# Patient Record
Sex: MALE | Race: WHITE | Employment: UNEMPLOYED | URBAN - METROPOLITAN AREA
[De-identification: names, ages, dates, MRNs, and addresses within clinical notes are randomized per-mention and may not be internally consistent; named-entity substitution may affect disease eponyms.]

---

## 2017-09-06 ENCOUNTER — OFFICE VISIT (OUTPATIENT)
Dept: ORTHOPEDIC SURGERY | Age: 37
End: 2017-09-06

## 2017-09-06 VITALS — WEIGHT: 175.04 LBS | BODY MASS INDEX: 24.51 KG/M2 | HEIGHT: 71 IN

## 2017-09-06 DIAGNOSIS — M79.642 BILATERAL HAND PAIN: Primary | ICD-10-CM

## 2017-09-06 DIAGNOSIS — R20.0 BILATERAL HAND NUMBNESS: ICD-10-CM

## 2017-09-06 DIAGNOSIS — M79.641 BILATERAL HAND PAIN: Primary | ICD-10-CM

## 2017-09-06 PROCEDURE — 73130 X-RAY EXAM OF HAND: CPT | Performed by: ORTHOPAEDIC SURGERY

## 2017-09-06 PROCEDURE — 99203 OFFICE O/P NEW LOW 30 MIN: CPT | Performed by: ORTHOPAEDIC SURGERY

## 2017-10-06 ENCOUNTER — OFFICE VISIT (OUTPATIENT)
Dept: ORTHOPEDIC SURGERY | Age: 37
End: 2017-10-06

## 2017-10-06 DIAGNOSIS — G56.03 BILATERAL CARPAL TUNNEL SYNDROME: Primary | ICD-10-CM

## 2017-10-06 PROCEDURE — 95909 NRV CNDJ TST 5-6 STUDIES: CPT | Performed by: PHYSICAL MEDICINE & REHABILITATION

## 2017-10-06 PROCEDURE — 95886 MUSC TEST DONE W/N TEST COMP: CPT | Performed by: PHYSICAL MEDICINE & REHABILITATION

## 2017-10-06 NOTE — MR AVS SNAPSHOT
After Visit Summary             Kathrine Stuart   10/6/2017 3:00 PM   Office Visit    Description:  Male : 1980   Provider:  Reggie Chopra MD   Department:  [address] [place] [address] and Future Appointments         Below is a list of your follow-up and future appointments. This may not be a complete list as you may have made appointments directly with providers that we are not aware of or your providers may have made some for you. Please call your providers to confirm appointments. It is important to keep your appointments. Please bring your current insurance card, photo ID, co-pay, and all medication bottles to your appointment. If self-pay, payment is expected at the time of service. Your To-Do List     Future Appointments Provider Department Dept Phone    10/9/2017 1:00 PM Marybeth Roger MD [facility] [phone]    Please arrive 15 minutes prior to appointment, bring photo ID and insurance card.          Information from Your Visit        Department     Name Address Phone Fax    [facility] [address] [phone]      You Were Seen for:         Comments    Bilateral carpal tunnel syndrome   [[id number]]         Vital Signs     Smoking Status                   Current Every Day Smoker              Medications and Orders      Allergies              Penicillins     Tramadol     Vicodin [Hydrocodone-acetaminophen]       We Ordered/Performed the following           ME MOTOR &/SENS 7-8 NRV CNDJ PRECONF ELTRODE LIMB     ME NEEDLE EMG EA EXTREMTY W/PARASPINL AREA COMPLETE          Additional Information        Basic Information     Date Of Birth Sex Race Ethnicity Preferred Language    1980 Male White Non-/Non  English      Problem List as of 10/6/2017  Date Reviewed: 2012                Depression    Depressive disorder    Back injury If you have questions, please contact the physician practice where you receive care. Remember, MyChart is NOT to be used for urgent needs. For medical emergencies, dial 911. For questions regarding your [facility] account call [phone]. If you have a clinical question, please call your doctor's office.

## 2017-10-06 NOTE — PROGRESS NOTES
APB.  The remainder of the nerve conduction studies and monopolar exam are normal, as recorded above. Impression: Abnormal examination. There is electrodiagnostic evidence of:    1.   Moderate bilateral median mononeuropathies around the wrists(carpal tunnel syndrome), chronic on the left            Bridgette Gupta MD

## 2017-10-09 ENCOUNTER — OFFICE VISIT (OUTPATIENT)
Dept: ORTHOPEDIC SURGERY | Age: 37
End: 2017-10-09

## 2017-10-09 VITALS — BODY MASS INDEX: 24.51 KG/M2 | HEIGHT: 71 IN | WEIGHT: 175.04 LBS

## 2017-10-09 DIAGNOSIS — G56.03 BILATERAL CARPAL TUNNEL SYNDROME: Primary | ICD-10-CM

## 2017-10-09 PROCEDURE — 99213 OFFICE O/P EST LOW 20 MIN: CPT | Performed by: ORTHOPAEDIC SURGERY

## 2017-10-09 PROCEDURE — 20526 THER INJECTION CARP TUNNEL: CPT | Performed by: ORTHOPAEDIC SURGERY

## 2017-10-09 RX ORDER — MELOXICAM 15 MG/1
15 TABLET ORAL DAILY
Qty: 30 TABLET | Refills: 3 | Status: SHIPPED | OUTPATIENT
Start: 2017-10-09 | End: 2017-12-08

## 2017-10-09 NOTE — PROGRESS NOTES
INJECTION ADMINISTRATION DETAILS:    Date & Time:  10/9/17 1:21 PM  Site & Comments: right wrist  Administered by Dr Roddy Jaquez    Betamethasone (30mg/mL)  #of CC:1  NDC #: 8714-6904-39  Lot #: [id number]  EXP: 2/2019    1% Lidocaine ( 10mg/mL)  # of CC : 1  NDC #: 8274-4395-16  LOT# :-[id number]  EXP :9/2018

## 2017-12-06 ENCOUNTER — TELEPHONE (OUTPATIENT)
Dept: ORTHOPEDIC SURGERY | Age: 37
End: 2017-12-06

## 2017-12-11 ENCOUNTER — HOSPITAL ENCOUNTER (OUTPATIENT)
Dept: SURGERY | Age: 37
Discharge: HOME | End: 2017-12-11
Attending: ORTHOPAEDIC SURGERY | Admitting: ORTHOPAEDIC SURGERY

## 2017-12-11 VITALS
HEART RATE: 66 BPM | TEMPERATURE: 97 F | RESPIRATION RATE: 16 BRPM | OXYGEN SATURATION: 97 % | DIASTOLIC BLOOD PRESSURE: 60 MMHG | SYSTOLIC BLOOD PRESSURE: 114 MMHG

## 2017-12-11 RX ORDER — HYDROMORPHONE HCL 110MG/55ML
0.5 PATIENT CONTROLLED ANALGESIA SYRINGE INTRAVENOUS EVERY 10 MIN PRN
Status: DISCONTINUED | OUTPATIENT
Start: 2017-12-11 | End: 2017-12-12 | Stop reason: HOSPADM

## 2017-12-11 RX ORDER — LABETALOL HYDROCHLORIDE 5 MG/ML
5 INJECTION, SOLUTION INTRAVENOUS
Status: DISCONTINUED | OUTPATIENT
Start: 2017-12-11 | End: 2017-12-12 | Stop reason: HOSPADM

## 2017-12-11 RX ORDER — OXYCODONE HYDROCHLORIDE AND ACETAMINOPHEN 5; 325 MG/1; MG/1
1 TABLET ORAL EVERY 6 HOURS PRN
Qty: 25 TABLET | Refills: 0 | Status: SHIPPED | OUTPATIENT
Start: 2017-12-11 | End: 2017-12-18

## 2017-12-11 RX ORDER — HYDRALAZINE HYDROCHLORIDE 20 MG/ML
5 INJECTION INTRAMUSCULAR; INTRAVENOUS EVERY 30 MIN PRN
Status: DISCONTINUED | OUTPATIENT
Start: 2017-12-11 | End: 2017-12-12 | Stop reason: HOSPADM

## 2017-12-11 RX ORDER — SODIUM CHLORIDE, SODIUM LACTATE, POTASSIUM CHLORIDE, CALCIUM CHLORIDE 600; 310; 30; 20 MG/100ML; MG/100ML; MG/100ML; MG/100ML
INJECTION, SOLUTION INTRAVENOUS CONTINUOUS
Status: DISCONTINUED | OUTPATIENT
Start: 2017-12-11 | End: 2017-12-12 | Stop reason: HOSPADM

## 2017-12-11 RX ORDER — OXYCODONE HYDROCHLORIDE AND ACETAMINOPHEN 5; 325 MG/1; MG/1
1 TABLET ORAL PRN
Status: ACTIVE | OUTPATIENT
Start: 2017-12-11 | End: 2017-12-11

## 2017-12-11 RX ORDER — DIPHENHYDRAMINE HYDROCHLORIDE 50 MG/ML
6.25 INJECTION INTRAMUSCULAR; INTRAVENOUS
Status: COMPLETED | OUTPATIENT
Start: 2017-12-11 | End: 2017-12-11

## 2017-12-11 RX ORDER — ONDANSETRON 2 MG/ML
4 INJECTION INTRAMUSCULAR; INTRAVENOUS EVERY 30 MIN PRN
Status: DISCONTINUED | OUTPATIENT
Start: 2017-12-11 | End: 2017-12-12 | Stop reason: HOSPADM

## 2017-12-11 RX ORDER — 0.9 % SODIUM CHLORIDE 0.9 %
10 VIAL (ML) INJECTION EVERY 12 HOURS SCHEDULED
Status: DISCONTINUED | OUTPATIENT
Start: 2017-12-11 | End: 2017-12-12 | Stop reason: HOSPADM

## 2017-12-11 RX ORDER — MEPERIDINE HYDROCHLORIDE 25 MG/ML
12.5 INJECTION INTRAMUSCULAR; INTRAVENOUS; SUBCUTANEOUS EVERY 5 MIN PRN
Status: DISCONTINUED | OUTPATIENT
Start: 2017-12-11 | End: 2017-12-12 | Stop reason: HOSPADM

## 2017-12-11 RX ORDER — OXYCODONE HYDROCHLORIDE AND ACETAMINOPHEN 5; 325 MG/1; MG/1
2 TABLET ORAL PRN
Status: ACTIVE | OUTPATIENT
Start: 2017-12-11 | End: 2017-12-11

## 2017-12-11 RX ORDER — HYDROMORPHONE HCL 110MG/55ML
0.5 PATIENT CONTROLLED ANALGESIA SYRINGE INTRAVENOUS EVERY 5 MIN PRN
Status: DISCONTINUED | OUTPATIENT
Start: 2017-12-11 | End: 2017-12-12 | Stop reason: HOSPADM

## 2017-12-11 RX ORDER — LIDOCAINE HYDROCHLORIDE 10 MG/ML
1 INJECTION, SOLUTION EPIDURAL; INFILTRATION; INTRACAUDAL; PERINEURAL
Status: COMPLETED | OUTPATIENT
Start: 2017-12-11 | End: 2017-12-11

## 2017-12-11 RX ORDER — 0.9 % SODIUM CHLORIDE 0.9 %
10 VIAL (ML) INJECTION PRN
Status: DISCONTINUED | OUTPATIENT
Start: 2017-12-11 | End: 2017-12-12 | Stop reason: HOSPADM

## 2017-12-11 RX ADMIN — LIDOCAINE HYDROCHLORIDE 0.1 ML: 10 INJECTION, SOLUTION EPIDURAL; INFILTRATION; INTRACAUDAL; PERINEURAL at 07:32

## 2017-12-11 RX ADMIN — DIPHENHYDRAMINE HYDROCHLORIDE 6.25 MG: 50 INJECTION INTRAMUSCULAR; INTRAVENOUS at 08:50

## 2017-12-11 RX ADMIN — SODIUM CHLORIDE, SODIUM LACTATE, POTASSIUM CHLORIDE, CALCIUM CHLORIDE: 600; 310; 30; 20 INJECTION, SOLUTION INTRAVENOUS at 07:33

## 2017-12-11 ASSESSMENT — PAIN - FUNCTIONAL ASSESSMENT: PAIN_FUNCTIONAL_ASSESSMENT: 0-10

## 2017-12-11 ASSESSMENT — PAIN DESCRIPTION - DESCRIPTORS: DESCRIPTORS: ACHING

## 2017-12-11 NOTE — ANESTHESIA POST-OP
Postoperative Anesthesia Note    Name:    Bernard Moura  MRN:      [id number]    Patient Vitals for the past 12 hrs:   BP Temp Temp src Pulse Resp SpO2   12/11/17 0915 114/60 - - 66 16 97 %   12/11/17 0900 115/69 - - 61 16 98 %   12/11/17 0845 121/77 - - 66 16 98 %   12/11/17 0833 120/68 97 °F (36.1 °C) Temporal 67 16 99 %   12/11/17 0714 108/63 97.7 °F (36.5 °C) Temporal 71 16 -        LABS:    CBC  Lab Results   Component Value Date/Time    WBC 6.9 09/19/2016 07:06 PM    HGB 13.8 09/19/2016 07:06 PM    HCT 41.1 09/19/2016 07:06 PM     09/19/2016 07:06 PM     RENAL  Lab Results   Component Value Date/Time     09/19/2016 07:06 PM    K 3.4 (L) 09/19/2016 07:06 PM     09/19/2016 07:06 PM    CO2 26 09/19/2016 07:06 PM    BUN 8 09/19/2016 07:06 PM    CREATININE 1.0 09/19/2016 07:06 PM    GLUCOSE 99 09/19/2016 07:06 PM     COAGS  Lab Results   Component Value Date/Time    PROTIME 10.8 08/21/2011 11:10 PM    INR 0.99 08/21/2011 11:10 PM    APTT 28.8 08/21/2011 11:10 PM       Intake & Output: In: 600 [I.V.:600]  Out: -     Nausea & Vomiting:  No    Level of Consciousness:  Awake    Pain Assessment:  Adequate analgesia    Anesthesia Complications:  No apparent anesthetic complications    SUMMARY      Vital signs stable  OK to discharge from Stage I post anesthesia care.   Care transferred from Anesthesiology department on discharge from perioperative area

## 2017-12-11 NOTE — OP NOTE
noted.  The nerve was nicely decompressed and healthy-appearing. The wound was copiously irrigated and the skin closed using horizontal mattress #5-0 nylon suture. Tourniquet was deflated and all fingers were warm and well perfused. Soft sterile dressing was placed. Under sterile conditions, the left carpal tunnel was injected with 1cc of 1% lidocaine without epinephrine, and 1cc of celestone without difficulty. A bandaid was placed. Patient was awoken from the sedation, tolerated the case quite well, was taken to the postanesthesia recovery unit in good condition. No complications. Findings:  No aberrant motor branch    Intervention:  1% Xylocaine, 0.25% Marcaine, without epinephrine as local injection  Celestone    Other Notes: Follow-up in 7-10 days for wound inspection and likely suture removal.  Patient will be taught a home exercise range of motion program for the wrist and fingers along with scar and thenar massage. If there are any concerns or if the patient desires, hand therapy will be ordered.           Pierce Rey MD  Hand & Upper Extremity Surgery  [address] [facility] [place] [facility] partner of [facility] ([facility])
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
## 2017-12-11 NOTE — PROGRESS NOTES
Admitted to PACU. Respirations easy and regular. Right hand elevated with ice. Right fingers warm and pink with good cap refill.
process. 23. Patient pain level is established preoperatively using age appropriate pain scale. 24. The patient will move to fall risk upon sedation- during and through the recovery phase. [place] to environment. Call light in reach. Instruct to call for assistance prior to getting up. Non skid footwear. Bed wheels locked. Bed in lowest position. Siderails up times two. Family at chairside/bedside preoperatively to assist with increased observation of patient.

## 2017-12-20 ENCOUNTER — OFFICE VISIT (OUTPATIENT)
Dept: ORTHOPEDIC SURGERY | Age: 37
End: 2017-12-20

## 2017-12-20 VITALS — BODY MASS INDEX: 24.51 KG/M2 | HEIGHT: 71 IN | WEIGHT: 175.04 LBS

## 2017-12-20 DIAGNOSIS — G56.01 CARPAL TUNNEL SYNDROME, RIGHT: Primary | ICD-10-CM

## 2017-12-20 DIAGNOSIS — G56.03 BILATERAL CARPAL TUNNEL SYNDROME: ICD-10-CM

## 2017-12-20 PROCEDURE — 99024 POSTOP FOLLOW-UP VISIT: CPT | Performed by: ORTHOPAEDIC SURGERY

## 2017-12-20 PROCEDURE — L3908 WHO COCK-UP NONMOLDE PRE OTS: HCPCS | Performed by: ORTHOPAEDIC SURGERY

## 2017-12-20 RX ORDER — OXYCODONE HYDROCHLORIDE AND ACETAMINOPHEN 5; 325 MG/1; MG/1
1 TABLET ORAL EVERY 8 HOURS PRN
Qty: 21 TABLET | Refills: 0 | Status: SHIPPED | OUTPATIENT
Start: 2017-12-20 | End: 2017-12-27

## 2017-12-20 RX ORDER — METHYLPREDNISOLONE 4 MG/1
TABLET ORAL
Qty: 1 KIT | Refills: 0 | Status: SHIPPED | OUTPATIENT
Start: 2017-12-20 | End: 2017-12-26

## 2017-12-20 NOTE — PROGRESS NOTES
Chief Complaint   Patient presents with    Post-Op Check     Right hand CTR sx 12/11/2017, Left CTS inj        HISTORY OF PRESENT ILLNESS:  Vivienne Gomez is a 30 y.o.  patient here for post-op follow up after right carpal tunnel release. Surgery was now 9 days ago. The patient is doing quite well overall. He still has some tingling of the ring finger tip. Pain in the hand is mild to moderate. He feels more stiffness than exspected    ROS:  ROS neg     MEDICAL HISTORY:  Patient's medications, allergies, past medical, surgical, social and family histories were reviewed and updated as appropriate. PHYSICAL EXAMINATION:  Alert and pleasant, no distress. The right hand and wrist is examined. The wound is healing without signs of infection or dehiscense. Mild swelling noted. There is satisfactory range of motion of the wrist and fingers. Thumb abduction is present. Fingers are well perfused. Left wrist reveals no injection site changes. IMPRESSION AND PLAN:  bilateral carpal tunnel sydrome  Doing well after right carpal tunnel release, left carpal tunnel cortisone injection. We will plan for suture removal and discuss appropriate wound care. We discussed scar and thenar massage, progressive ROM of hand and fingers. Activities may be advanced depending on comfort. We will fit and provide a gel brace to the right hand until palmar sensitivity improves. We have ordered postoperative therapy based on his desire. We discussed a Medrol Dosepak, after discussing risks and benefits he was interested in trying. We also refilled pain medication which he understands to wean off. Procedures    Gel Wrist Daisy Dooley Brace     Patient was prescribed a Daisy Dooley Gel Wrist Brace. The right wrist will require stabilization / immobilization from this semi-rigid / rigid orthosis to improve their function.   The orthosis will assist in protecting the affected area, provide functional support and facilitate healing. The patient was educated and fit by a healthcare professional with expert knowledge and specialization in brace application while under the direct supervision of the treating physician. Verbal and written instructions for the use of and application of this item were provided. They were instructed to contact the office immediately should the brace result in increased pain, decreased sensation, increased swelling or worsening of the condition. Follow-up in 6 weeks to assess improvement. Follow-up sooner if needed. Time to allow full resolution of finger tingling. All questions and concerns were addressed today. Patient is in agreement with the plan.         Arpita Castaneda MD  Hand & Upper Extremity Surgery  [address] [facility] ([facility])

## 2018-01-01 ENCOUNTER — HOSPITAL ENCOUNTER (OUTPATIENT)
Dept: OCCUPATIONAL THERAPY | Age: 38
Discharge: HOME | End: 2018-01-31
Attending: ORTHOPAEDIC SURGERY | Admitting: ORTHOPAEDIC SURGERY

## 2018-02-28 ENCOUNTER — OFFICE VISIT (OUTPATIENT)
Dept: ORTHOPEDIC SURGERY | Age: 38
End: 2018-02-28

## 2018-02-28 VITALS — BODY MASS INDEX: 23.8 KG/M2 | WEIGHT: 169.97 LBS | HEIGHT: 71 IN

## 2018-02-28 DIAGNOSIS — M25.531 WRIST PAIN, ACUTE, RIGHT: ICD-10-CM

## 2018-02-28 DIAGNOSIS — M65.351 TRIGGER LITTLE FINGER OF RIGHT HAND: ICD-10-CM

## 2018-02-28 DIAGNOSIS — G56.01 CARPAL TUNNEL SYNDROME, RIGHT: Primary | ICD-10-CM

## 2018-02-28 PROCEDURE — 99024 POSTOP FOLLOW-UP VISIT: CPT | Performed by: ORTHOPAEDIC SURGERY

## 2018-02-28 RX ORDER — METHYLPREDNISOLONE 4 MG/1
TABLET ORAL
Qty: 1 KIT | Refills: 0 | Status: SHIPPED | OUTPATIENT
Start: 2018-02-28 | End: 2018-03-06

## 2018-02-28 NOTE — PROGRESS NOTES
Chief Complaint   Patient presents with    Follow-up     Right hand CTR sx 12/11/2017, Left CTS inj        HISTORY OF PRESENT ILLNESS:  Delmy Meyer is a 30 y.o.  patient here for post-op follow up after right carpal tunnel release. Surgery was now 2.5 months ago. The patient presents today for follow-up after he sustained a popping sensation and significant pain on the volar aspect of the right hand. Patient states that this occurred while utilizing his hand. He did not have a fall. He did not notice a finger being locked down. Pain caused a shooting pain up the forearm and weakness in the hand. He doesn't feel any recurrence of the numbness. He did go to the emergency room for this. He has been wearing a brace and was given some pain medication. ROS:  ROS neg     MEDICAL HISTORY:  Patient's medications, allergies, past medical, surgical, social and family histories were reviewed and updated as appropriate. PHYSICAL EXAMINATION:  Alert and pleasant, no distress. The right hand and wrist is examined. The wound is healed without signs of infection or dehiscense. No swelling noted. No erythema. No palpable cyst at the wrist.  No fluctuance or crepitance over the volar tendons. There is satisfactory range of motion of the wrist and fingers. Thumb abduction is present. Fingers are well perfused. there is a faint click over the A1 pulley of the small finger. No locking of the digits.  weakness is noted. No muscular atrophy. Fingers are sensate. IMPRESSION AND PLAN:  right carpal tunnel sydrome, right wrist pain. Early mild triggering right small finger  We discussed proceeding with MRI of the right wrist at this point to route ligamentous rupture or other tendon injury or internal derangement. I agree with his use of a brace and activity modifications at this point.   He does not have diabetes, we discussed a Medrol Dosepak to help with inflammation, he was in agreement with trying

## 2018-03-13 ENCOUNTER — TELEPHONE (OUTPATIENT)
Dept: ORTHOPEDIC SURGERY | Age: 38
End: 2018-03-13

## 2018-03-13 NOTE — TELEPHONE ENCOUNTER
Called patient left message stating MRI approved and to schedule with Sidney Abreu and follow back up in office with results

## 2018-11-16 ENCOUNTER — HOSPITAL ENCOUNTER (EMERGENCY)
Age: 38
Discharge: HOME | End: 2018-11-16
Payer: COMMERCIAL

## 2018-11-16 ENCOUNTER — APPOINTMENT (OUTPATIENT)
Dept: CT IMAGING | Age: 38
End: 2018-11-16
Payer: COMMERCIAL

## 2018-11-16 VITALS
BODY MASS INDEX: 23.1 KG/M2 | SYSTOLIC BLOOD PRESSURE: 146 MMHG | TEMPERATURE: 98.8 F | OXYGEN SATURATION: 100 % | WEIGHT: 165 LBS | HEART RATE: 75 BPM | RESPIRATION RATE: 18 BRPM | DIASTOLIC BLOOD PRESSURE: 82 MMHG | HEIGHT: 71 IN

## 2018-11-16 DIAGNOSIS — S01.01XA LACERATION OF SCALP, INITIAL ENCOUNTER: ICD-10-CM

## 2018-11-16 DIAGNOSIS — S09.90XA CLOSED HEAD INJURY, INITIAL ENCOUNTER: Primary | ICD-10-CM

## 2018-11-16 PROCEDURE — 4500000024 HC ED LEVEL 4 PROCEDURE

## 2018-11-16 PROCEDURE — 90715 TDAP VACCINE 7 YRS/> IM: CPT | Performed by: NURSE PRACTITIONER

## 2018-11-16 PROCEDURE — 6370000000 HC RX 637 (ALT 250 FOR IP): Performed by: NURSE PRACTITIONER

## 2018-11-16 PROCEDURE — 70450 CT HEAD/BRAIN W/O DYE: CPT

## 2018-11-16 PROCEDURE — 99284 EMERGENCY DEPT VISIT MOD MDM: CPT

## 2018-11-16 PROCEDURE — 90471 IMMUNIZATION ADMIN: CPT | Performed by: NURSE PRACTITIONER

## 2018-11-16 PROCEDURE — 6360000002 HC RX W HCPCS: Performed by: NURSE PRACTITIONER

## 2018-11-16 RX ORDER — ACETAMINOPHEN 500 MG
1000 TABLET ORAL ONCE
Status: COMPLETED | OUTPATIENT
Start: 2018-11-16 | End: 2018-11-16

## 2018-11-16 RX ADMIN — ACETAMINOPHEN 1000 MG: 500 TABLET ORAL at 21:15

## 2018-11-16 RX ADMIN — TETANUS TOXOID, REDUCED DIPHTHERIA TOXOID AND ACELLULAR PERTUSSIS VACCINE, ADSORBED 0.5 ML: 5; 2.5; 8; 8; 2.5 SUSPENSION INTRAMUSCULAR at 21:15

## 2018-11-16 ASSESSMENT — ENCOUNTER SYMPTOMS
COUGH: 0
VOMITING: 0
SHORTNESS OF BREATH: 0
NAUSEA: 0

## 2018-11-16 ASSESSMENT — PAIN SCALES - GENERAL: PAINLEVEL_OUTOF10: 9

## 2018-11-17 NOTE — ED PROVIDER NOTES
Final Result   No acute abnormality of the head. No results found. MEDICAL DECISION MAKING / ED COURSE:      PROCEDURES:   Lac Repair  Date/Time: 11/16/2018 9:29 PM  Performed by: Mati Mcdowell  Authorized by: Mati Mcdowell     Consent:     Consent obtained:  Verbal    Consent given by:  Patient    Risks discussed:  Infection and pain  Anesthesia (see MAR for exact dosages): Anesthesia method:  None  Laceration details:     Location:  Scalp    Scalp location:  L parietal    Length (cm):  1.5  Repair type:     Repair type:  Simple  Pre-procedure details:     Preparation:  Patient was prepped and draped in usual sterile fashion  Exploration:     Wound exploration: wound explored through full range of motion and entire depth of wound probed and visualized    Treatment:     Area cleansed with:  Hibiclens and saline    Amount of cleaning:  Standard  Skin repair:     Repair method:  Staples    Number of staples:  2  Approximation:     Approximation:  Close  Post-procedure details:     Dressing:  Open (no dressing)    Patient tolerance of procedure: Tolerated well, no immediate complications        None    Patient was given:  Medications   Tetanus-Diphth-Acell Pertussis (BOOSTRIX) injection 0.5 mL (0.5 mLs Intramuscular Given 11/16/18 2115)   acetaminophen (TYLENOL) tablet 1,000 mg (1,000 mg Oral Given 11/16/18 2115)       Patient presented to the emergency department with complaints of a head laceration after striking his head on the door frame at the retirement. He also reported nausea as well as headache. I did obtain a CT scan which showed no acute abnormality. Laceration was repaired as above. Patient was given instructions on head injury. He is to have the staples removed in 5 days. He is to follow-up with primary care as needed. He is to return to the emergency department with any worsening symptoms.   I discussed treatment plan with patient, patient is agreeable and denies

## 2019-05-14 ENCOUNTER — APPOINTMENT (OUTPATIENT)
Dept: GENERAL RADIOLOGY | Age: 39
End: 2019-05-14
Payer: COMMERCIAL

## 2019-05-14 ENCOUNTER — HOSPITAL ENCOUNTER (EMERGENCY)
Age: 39
Discharge: HOME | End: 2019-05-15
Payer: COMMERCIAL

## 2019-05-14 VITALS
DIASTOLIC BLOOD PRESSURE: 78 MMHG | RESPIRATION RATE: 16 BRPM | WEIGHT: 185 LBS | TEMPERATURE: 98.2 F | HEART RATE: 73 BPM | HEIGHT: 71 IN | OXYGEN SATURATION: 99 % | SYSTOLIC BLOOD PRESSURE: 130 MMHG | BODY MASS INDEX: 25.9 KG/M2

## 2019-05-14 DIAGNOSIS — S92.151A CLOSED DISPLACED AVULSION FRACTURE OF RIGHT TALUS, INITIAL ENCOUNTER: Primary | ICD-10-CM

## 2019-05-14 PROCEDURE — 4500000023 HC ED LEVEL 3 PROCEDURE

## 2019-05-14 PROCEDURE — 99283 EMERGENCY DEPT VISIT LOW MDM: CPT

## 2019-05-14 PROCEDURE — 73610 X-RAY EXAM OF ANKLE: CPT

## 2019-05-14 RX ORDER — BUPRENORPHINE HYDROCHLORIDE AND NALOXONE HYDROCHLORIDE DIHYDRATE 8; 2 MG/1; MG/1
TABLET SUBLINGUAL
COMMUNITY
Start: 2019-05-13

## 2019-05-14 RX ORDER — QUETIAPINE FUMARATE 100 MG/1
300 TABLET, FILM COATED ORAL NIGHTLY
COMMUNITY
Start: 2019-04-26

## 2019-05-14 RX ORDER — GABAPENTIN 400 MG/1
400 CAPSULE ORAL 3 TIMES DAILY
COMMUNITY
Start: 2019-04-17

## 2019-05-14 RX ORDER — VENLAFAXINE HYDROCHLORIDE 150 MG/1
150 CAPSULE, EXTENDED RELEASE ORAL DAILY
COMMUNITY
Start: 2019-04-23

## 2019-05-14 ASSESSMENT — PAIN DESCRIPTION - PAIN TYPE: TYPE: ACUTE PAIN

## 2019-05-14 ASSESSMENT — PAIN DESCRIPTION - LOCATION: LOCATION: ANKLE

## 2019-05-14 ASSESSMENT — PAIN SCALES - GENERAL: PAINLEVEL_OUTOF10: 7

## 2019-05-14 ASSESSMENT — PAIN DESCRIPTION - ORIENTATION: ORIENTATION: RIGHT

## 2019-05-15 ASSESSMENT — PAIN SCALES - GENERAL: PAINLEVEL_OUTOF10: 5

## 2019-05-15 NOTE — ED NOTES
Pt scripts x1 instructed to follow up with [facility]. Assessed per [address] [place] [address] [place].      Emir Krause LPN  58/92/15 [id number]

## 2019-05-15 NOTE — ED PROVIDER NOTES
education level: Not on file   Occupational History    Not on file   Social Needs    Financial resource strain: Not on file    Food insecurity:     Worry: Not on file     Inability: Not on file    Transportation needs:     Medical: Not on file     Non-medical: Not on file   Tobacco Use    Smoking status: Current Every Day Smoker     Packs/day: 1.00     Years: 15.00     Pack years: 15.00     Types: Cigarettes    Smokeless tobacco: Never Used   Substance and Sexual Activity    Alcohol use: No    Drug use: Yes     Types: Marijuana     Comment: occasionally    Sexual activity: Never   Lifestyle    Physical activity:     Days per week: Not on file     Minutes per session: Not on file    Stress: Not on file   Relationships    Social connections:     Talks on phone: Not on file     Gets together: Not on file     Attends Yarsanism service: Not on file     Active member of club or organization: Not on file     Attends meetings of clubs or organizations: Not on file     Relationship status: Not on file    Intimate partner violence:     Fear of current or ex partner: Not on file     Emotionally abused: Not on file     Physically abused: Not on file     Forced sexual activity: Not on file   Other Topics Concern    Not on file   Social History Narrative    Not on file     No current facility-administered medications for this encounter. Current Outpatient Medications   Medication Sig Dispense Refill    gabapentin (NEURONTIN) 400 MG capsule       QUEtiapine (SEROQUEL) 100 MG tablet       venlafaxine (EFFEXOR XR) 150 MG extended release capsule       buprenorphine-naloxone (SUBOXONE) 8-2 MG SUBL SL tablet        Allergies   Allergen Reactions    Penicillins     Tramadol     Vicodin [Hydrocodone-Acetaminophen]        REVIEW OF SYSTEMS:  6 systems reviewed, pertinent positives per HPI otherwise noted to be negative.     PHYSICAL EXAM:  /78   Pulse 73   Temp 98.2 °F (36.8 °C) (Oral)   Resp 16   Ht patient is comfortable and all questions are answered  5. Orthopedic referral for follow up within 7 days is provided      ED COURSE/MDM:  Patient was given the following medications: None    I have evaluated this patient here in the ED. he describes an acute injury to his right ankle. X-rays of the ankle joint reveal an avulsion fracture to the lateral talus. The patient is made aware of this. The patient is placed in a splint and given crutches. He is given instruction to ice, elevate and follow-up with orthopedics. All questions answered prior to discharge. I estimate there is LOW risk for COMPARTMENT SYNDROME, DEEP VENOUS THROMBOSIS, SEPTIC ARTHRITIS, TENDON OR NEUROVASCULAR INJURY, thus I consider the discharge disposition reasonable. Kennedi Rashid and I have discussed the diagnosis and risks, and we agree with discharging home to follow-up with their primary doctor or the referral orthopedist. We also discussed returning to the Emergency Department immediately if new or worsening symptoms occur. We have discussed the symptoms which are most concerning (e.g., changing or worsening pain, numbness, weakness) that necessitate immediate return. CLINICAL IMPRESSION:  1. Closed displaced avulsion fracture of right talus, initial encounter        Blood pressure 130/78, pulse 73, temperature 98.2 °F (36.8 °C), temperature source Oral, resp. rate 16, height 5' 11\" (1.803 m), weight 185 lb (83.9 kg), SpO2 99 %. PATIENT REFERRED TO:  DO Lynnette Lewis [address] [facility] [address] [facility]  [phone]    Schedule an appointment as soon as possible for a visit           DISPOSITION  Patient was discharged to home in good condition.           Quan [facility]  05/15/19 0005

## 2019-05-15 NOTE — ED NOTES
Pt placed in 5in Ortho glass Short leg splint to right ankle/fitted for crutches.      Igor Bergeron LPN  74/87/03 [id number]

## 2019-05-16 ENCOUNTER — OFFICE VISIT (OUTPATIENT)
Dept: ORTHOPEDIC SURGERY | Age: 39
End: 2019-05-16
Payer: COMMERCIAL

## 2019-05-16 VITALS — WEIGHT: 184.97 LBS | HEIGHT: 71 IN | BODY MASS INDEX: 25.9 KG/M2

## 2019-05-16 DIAGNOSIS — S92.154A CLOSED NONDISPLACED AVULSION FRACTURE OF RIGHT TALUS, INITIAL ENCOUNTER: ICD-10-CM

## 2019-05-16 DIAGNOSIS — S93.401A SPRAIN OF RIGHT ANKLE, UNSPECIFIED LIGAMENT, INITIAL ENCOUNTER: Primary | ICD-10-CM

## 2019-05-16 PROCEDURE — L4361 PNEUMA/VAC WALK BOOT PRE OTS: HCPCS | Performed by: ORTHOPAEDIC SURGERY

## 2019-05-16 PROCEDURE — 99203 OFFICE O/P NEW LOW 30 MIN: CPT | Performed by: ORTHOPAEDIC SURGERY

## 2019-05-16 NOTE — PROGRESS NOTES
Transportation needs:     Medical: None     Non-medical: None   Tobacco Use    Smoking status: Current Every Day Smoker     Packs/day: 1.00     Years: 15.00     Pack years: 15.00     Types: Cigarettes    Smokeless tobacco: Never Used   Substance and Sexual Activity    Alcohol use: No    Drug use: Yes     Types: Marijuana     Comment: occasionally    Sexual activity: Never   Lifestyle    Physical activity:     Days per week: None     Minutes per session: None    Stress: None   Relationships    Social connections:     Talks on phone: None     Gets together: None     Attends [employer]ism service: None     Active member of club or organization: None     Attends meetings of clubs or organizations: None     Relationship status: None    Intimate partner violence:     Fear of current or ex partner: None     Emotionally abused: None     Physically abused: None     Forced sexual activity: None   Other Topics Concern    None   Social History Narrative    None     Current Outpatient Medications   Medication Sig Dispense Refill    gabapentin (NEURONTIN) 400 MG capsule       QUEtiapine (SEROQUEL) 100 MG tablet       venlafaxine (EFFEXOR XR) 150 MG extended release capsule       buprenorphine-naloxone (SUBOXONE) 8-2 MG SUBL SL tablet        No current facility-administered medications for this visit. Allergies   Allergen Reactions    Penicillins     Tramadol     Vicodin [Hydrocodone-Acetaminophen]        Review of Systems:  Constitutional: negative  Respiratory: negative  Cardiovascular: negative  Musculoskeletal:negative except for Ankle Injury (WC R ANKLE)    Relevant review of systems reviewed and available in the patient's chart in media tab    Vital Signs:  Vitals:    05/16/19 1053   Weight: 184 lb 15.5 oz (83.9 kg)   Height: 5' 10.98\" (1.803 m)           General Exam:   Constitutional: Patient is adequately groomed with no evidence of malnutrition  Mental Status:  The patient is oriented to time, place and person. The patient's mood and affect are appropriate. Vascular: Examination reveals no swelling or calf tenderness. Peripheral pulses are palpable and 2+. Ankle Examination  Inspection:   No gross deformities noted. moderate swelling noted. No erythema or ecchymosis. Skin is intact. Intact sensation to light touch throughout the foot and good pedal pulses. Palpation: positive Tenderness to palpation along the medial malleolus and deltoid, negative Tenderness to palpation along lateral malleolus, positive Tenderness to palpation along ATFL and the lateral aspect of the talus, negative tenderness along the 5th metatarsal, Navicular or Lis Franc joint. Range of Motion:  He has decreased motion in the right foot secondary to pain, gently dorsi and plantar flexs      Strength and special tests are deferred given the acute nature of his injury. Does have a negative squeeze test     Skin: There are no rashes, ulcerations or lesions. Gait: Ambulating with crutches, nonweightbearing to the right lower extremity secondary to pain    Reflex: not tested    Additional Examinations:  Left Lower Extremity: Examination of the left lower extremity does not show any tenderness, deformity or injury. Range of motion is unremarkable. There is no gross instability. There are no rashes, ulcerations or lesions. Strength and tone are normal.      LUMBAR SPINE: The skin is warm and dry. There is no swelling, warmth, or erythema. Range of motion is within normal limits. There is no paraspinal or spinous process tenderness. Ipsilateral and contralateral straight leg raising tests are negative. The distal neurovascular exam is grossly intact and symmetric. X-RAYS: 3 views AP, Lateral, mortise of the right ankle were reviewed, they show no periosteal reaction, medullary lesions, or osteopenia. Joint spaces are well maintained. No evidence of dislocation. Ankle mortise in anatomic position.  Avulsion fracture lateral talus         Assessment :  Right ankle avulsion fracture of the lateral talus, ankle sprain    Impression:  Encounter Diagnoses   Name Primary?  Sprain of right ankle, unspecified ligament, initial encounter Yes    Closed nondisplaced avulsion fracture of right talus, initial encounter        Office Procedures:  Orders Placed This Encounter   Procedures    [facility]     Referral Priority:   Routine     Referral Type:   Eval and Treat     Referral Reason:   Specialty Services Required     Requested Specialty:   Physical Therapy     Number of Visits Requested:   1    [address]     Patient was prescribed a Breg Tall Genisus Walking Boot. The right ankle will require stabilization / immobilization from this semi-rigid / rigid orthosis to improve their function. The orthosis will assist in protecting the affected area, provide functional support and facilitate healing. Patient was instructed to progress ambulation, weight bearing as tolerated in the device. ,  as partial weight bearing in the device. and  as non weight bearing in the device. The plan of care is to progress the patient to full weight bearing status. The patient was educated and fit by a healthcare professional with expert knowledge and specialization in brace application while under the direct supervision of the physician. Verbal and written instructions for the use of and application of this item were provided. They were instructed to contact the office immediately should the brace result in increased pain, decreased sensation, increased swelling or worsening of the condition. Procedures    BREG TALL [address]     Patient was prescribed a Breg Tall Genisus Walking Boot. The right ankle will require stabilization / immobilization from this semi-rigid / rigid orthosis to improve their function.   The orthosis will assist in protecting the affected area, provide functional support and facilitate healing. Patient was instructed to progress ambulation, weight bearing as tolerated in the device. ,  as partial weight bearing in the device. and  as non weight bearing in the device. The plan of care is to progress the patient to full weight bearing status. The patient was educated and fit by a healthcare professional with expert knowledge and specialization in brace application while under the direct supervision of the physician. Verbal and written instructions for the use of and application of this item were provided. They were instructed to contact the office immediately should the brace result in increased pain, decreased sensation, increased swelling or worsening of the condition. Treatment Plan:      The xray findings were reviewed with the patient and explained to his that the pain was likely secondary to a talar avulsion and ankle sprain. The nature of his injury was discussed in detail, Avulsion fractures with ankle sprains can take more time to heal than a standard ankle sprain. Possibly 3-6 months. Generally surgery is not indicated. Ankle flexibility exercises were reviewed. Immobilization using a walking boot was placed today. Crutch ambulation will continue until he is able to ambulate in the boot without pain. This can take anywhere from 2-4 weeks. Safety instructions were given. Patient will begin physical therapy for range of motion, strengthening and pain and swelling control. The patient indicates understanding of these issues and agrees with the plan. They will follow up in 4 weeks, no further x-rays needed. 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 I recommend compression dressing, ibuprofen and ice to help as well.       Derian Lopez

## 2019-06-13 ENCOUNTER — OFFICE VISIT (OUTPATIENT)
Dept: ORTHOPEDIC SURGERY | Age: 39
End: 2019-06-13
Payer: COMMERCIAL

## 2019-06-13 VITALS — HEIGHT: 71 IN | BODY MASS INDEX: 25.9 KG/M2 | WEIGHT: 184.97 LBS

## 2019-06-13 DIAGNOSIS — S92.154D CLOSED NONDISPLACED AVULSION FRACTURE OF RIGHT TALUS WITH ROUTINE HEALING, SUBSEQUENT ENCOUNTER: ICD-10-CM

## 2019-06-13 DIAGNOSIS — S93.401D SPRAIN OF RIGHT ANKLE, UNSPECIFIED LIGAMENT, SUBSEQUENT ENCOUNTER: Primary | ICD-10-CM

## 2019-06-13 PROCEDURE — 99213 OFFICE O/P EST LOW 20 MIN: CPT | Performed by: ORTHOPAEDIC SURGERY

## 2019-06-13 NOTE — PROGRESS NOTES
Chief Complaint:  Follow-up ([facility] Right ankle avulsion fracture of the lateral talus, ankle sprain)      SUBJECTIVE:  Dionisio Mcadams is a 42 y.o. male who returns today for evaluation of right ankle, sustained a sprain and avulsion fracture 1 month ago, has been working light duty, this is a Workmen's Comp. injury. Patient states he has weaned out of the boot, is ambulating in regular shoe, has 0 out of 10 pain, complains of occasional popping. Pain Assessment:  Pain Assessment  Location of Pain: Ankle  Location Modifiers: Right  Severity of Pain: 0  Frequency of Pain: Intermittent  Aggravating Factors: Other (Comment), Stairs(over use)  Limiting Behavior: Some  Relieving Factors: Rest  Result of Injury: Yes  Work-Related Injury: Yes  Are there other pain locations you wish to document?: No      Medical History:  Patient's medications, allergies, past medical, surgical, social and family histories were reviewed and updated as appropriate. Review of Systems:  Constitutional: negative  Respiratory: negative  Cardiovascular: negative  Musculoskeletal:negative except for Follow-up ([facility] Right ankle avulsion fracture of the lateral talus, ankle sprain)    Relevant review of systems reviewed and available in the patient's chart in media tab    General Exam:   Constitutional: Patient is adequately groomed with no evidence of malnutrition  Mental Status: The patient is oriented to time, place and person. The patient's mood and affect are appropriate. Vascular: Examination reveals no swelling or calf tenderness. Peripheral pulses are palpable and 2+. OBJECTIVE:  Vital Signs:  Vitals:    06/13/19 1102   Weight: 184 lb 15.5 oz (83.9 kg)   Height: 5' 10.98\" (1.803 m)       Appearance: alert, well appearing, and in no distress, oriented to person, place, and time and normal appearing weight.     Physical exam:   No swelling to the right ankle, minimal tenderness to palpation over the ATFL and talus, tolerates dorsi

## 2019-08-27 ENCOUNTER — OFFICE VISIT (OUTPATIENT)
Dept: ORTHOPEDIC SURGERY | Age: 39
End: 2019-08-27
Payer: COMMERCIAL

## 2019-08-27 VITALS — BODY MASS INDEX: 25.9 KG/M2 | WEIGHT: 184.97 LBS | HEIGHT: 71 IN

## 2019-08-27 DIAGNOSIS — M79.642 LEFT HAND PAIN: Primary | ICD-10-CM

## 2019-08-27 DIAGNOSIS — M77.01 MEDIAL EPICONDYLITIS OF RIGHT ELBOW: ICD-10-CM

## 2019-08-27 DIAGNOSIS — G56.03 BILATERAL CARPAL TUNNEL SYNDROME: ICD-10-CM

## 2019-08-27 PROCEDURE — G8419 CALC BMI OUT NRM PARAM NOF/U: HCPCS | Performed by: ORTHOPAEDIC SURGERY

## 2019-08-27 PROCEDURE — 99213 OFFICE O/P EST LOW 20 MIN: CPT | Performed by: ORTHOPAEDIC SURGERY

## 2019-08-27 PROCEDURE — 4004F PT TOBACCO SCREEN RCVD TLK: CPT | Performed by: ORTHOPAEDIC SURGERY

## 2019-08-27 PROCEDURE — G8427 DOCREV CUR MEDS BY ELIG CLIN: HCPCS | Performed by: ORTHOPAEDIC SURGERY

## 2020-03-13 ENCOUNTER — TELEPHONE (OUTPATIENT)
Dept: ORTHOPEDIC SURGERY | Age: 40
End: 2020-03-13

## 2020-03-13 NOTE — TELEPHONE ENCOUNTER
Called & left voicemail for patient informing him that we need to reschedule his appointment, patient was informed doctor jennifer is out and he will need to see a different physician in the practice. Patient was given the number [phone] ext.  [phone]

## 2020-04-28 ENCOUNTER — HOSPITAL ENCOUNTER (EMERGENCY)
Age: 40
Discharge: HOME | End: 2020-04-28
Attending: EMERGENCY MEDICINE
Payer: COMMERCIAL

## 2020-04-28 VITALS
WEIGHT: 180 LBS | HEART RATE: 92 BPM | OXYGEN SATURATION: 95 % | DIASTOLIC BLOOD PRESSURE: 69 MMHG | RESPIRATION RATE: 18 BRPM | TEMPERATURE: 97.5 F | BODY MASS INDEX: 29.99 KG/M2 | SYSTOLIC BLOOD PRESSURE: 109 MMHG | HEIGHT: 65 IN

## 2020-04-28 LAB
A/G RATIO: 1.2 (ref 1.1–2.2)
ALBUMIN SERPL-MCNC: 4.3 G/DL (ref 3.4–5)
ALP BLD-CCNC: 88 U/L (ref 40–129)
ALT SERPL-CCNC: 36 U/L (ref 10–40)
AMPHETAMINE SCREEN, URINE: NORMAL
ANION GAP SERPL CALCULATED.3IONS-SCNC: 13 MMOL/L (ref 3–16)
AST SERPL-CCNC: 32 U/L (ref 15–37)
BARBITURATE SCREEN URINE: NORMAL
BASOPHILS ABSOLUTE: 0 K/UL (ref 0–0.2)
BASOPHILS RELATIVE PERCENT: 0.4 %
BENZODIAZEPINE SCREEN, URINE: NORMAL
BILIRUB SERPL-MCNC: 0.4 MG/DL (ref 0–1)
BUN BLDV-MCNC: 18 MG/DL (ref 7–20)
CALCIUM SERPL-MCNC: 9.2 MG/DL (ref 8.3–10.6)
CANNABINOID SCREEN URINE: NORMAL
CHLORIDE BLD-SCNC: 103 MMOL/L (ref 99–110)
CO2: 22 MMOL/L (ref 21–32)
COCAINE METABOLITE SCREEN URINE: NORMAL
CREAT SERPL-MCNC: 1.5 MG/DL (ref 0.9–1.3)
EOSINOPHILS ABSOLUTE: 0 K/UL (ref 0–0.6)
EOSINOPHILS RELATIVE PERCENT: 0.2 %
ETHANOL: NORMAL MG/DL (ref 0–0.08)
GFR AFRICAN AMERICAN: >60
GFR NON-AFRICAN AMERICAN: 52
GLOBULIN: 3.6 G/DL
GLUCOSE BLD-MCNC: 108 MG/DL (ref 70–99)
HCT VFR BLD CALC: 43 % (ref 40.5–52.5)
HEMOGLOBIN: 14.4 G/DL (ref 13.5–17.5)
LYMPHOCYTES ABSOLUTE: 1 K/UL (ref 1–5.1)
LYMPHOCYTES RELATIVE PERCENT: 12.5 %
Lab: NORMAL
MCH RBC QN AUTO: 29.9 PG (ref 26–34)
MCHC RBC AUTO-ENTMCNC: 33.5 G/DL (ref 31–36)
MCV RBC AUTO: 89.3 FL (ref 80–100)
METHADONE SCREEN, URINE: NORMAL
MONOCYTES ABSOLUTE: 0.5 K/UL (ref 0–1.3)
MONOCYTES RELATIVE PERCENT: 6.6 %
NEUTROPHILS ABSOLUTE: 6.4 K/UL (ref 1.7–7.7)
NEUTROPHILS RELATIVE PERCENT: 80.3 %
OPIATE SCREEN URINE: NORMAL
OXYCODONE URINE: NORMAL
PDW BLD-RTO: 14.4 % (ref 12.4–15.4)
PH UA: 5
PHENCYCLIDINE SCREEN URINE: NORMAL
PLATELET # BLD: 225 K/UL (ref 135–450)
PMV BLD AUTO: 9 FL (ref 5–10.5)
POTASSIUM REFLEX MAGNESIUM: 4.3 MMOL/L (ref 3.5–5.1)
PROPOXYPHENE SCREEN: NORMAL
RBC # BLD: 4.81 M/UL (ref 4.2–5.9)
SODIUM BLD-SCNC: 138 MMOL/L (ref 136–145)
TOTAL PROTEIN: 7.9 G/DL (ref 6.4–8.2)
WBC # BLD: 8 K/UL (ref 4–11)

## 2020-04-28 PROCEDURE — 99283 EMERGENCY DEPT VISIT LOW MDM: CPT

## 2020-04-28 PROCEDURE — 80053 COMPREHEN METABOLIC PANEL: CPT

## 2020-04-28 PROCEDURE — 85025 COMPLETE CBC W/AUTO DIFF WBC: CPT

## 2020-04-28 PROCEDURE — 80307 DRUG TEST PRSMV CHEM ANLYZR: CPT

## 2020-04-28 PROCEDURE — G0480 DRUG TEST DEF 1-7 CLASSES: HCPCS

## 2020-04-28 RX ORDER — DIVALPROEX SODIUM 500 MG/1
500 TABLET, DELAYED RELEASE ORAL DAILY
COMMUNITY

## 2020-04-29 NOTE — ED NOTES
CM from [facility] unaware of dose of Suboxine. Confirmed medications with her.       Robyn Lamar, DAKOTA  04/28/20 1942
Level of Care Disposition: Discharge     Patient was seen by ED provider and [facility] staff. The case was presented to psychiatric provider on-call Dr. Charissa Jenkins. Based on the ED evaluation and information presented to the provider by Doc Reddy RN and this writer the decision was made to discharge patient with SA tx referrals. RATIONALE FOR NON-ADMISSION:  The patient does not meet criteria for an involuntary psychiatric admission because he does not present as an imminent risk to himself or another person. He is currently denying all HI, SI, plan, and intent. He is future oriented with plans to return to work and attend his court date on 5/20/20. He identifies his children as reasons to live. Patient has demonstrated a reasonable safety plan to return home with his mother and follow up in outpt and residential [facility].              [address] [place] [address]  04/28/20 3451
Patient brought back from main ER. Patient in safety gown when arrived. Patient oriented to [facility]. Will continue to monitor patient.       Marquez Blackman RN  04/28/20 8391
Writer spoke with pt's mother, Ana María Lund, who states she was hoping pt would have been agreeable to go to [facility] for [employer] tx. Mother states she is willing to pick pt up and bring him to [facility] when he is ready to go. She states she will ensure he follows up with tx if discharged.      [address] [place] [address]  04/28/20 6985
to lethal means   []  Prior suicide attempt   [x]  Active substance abuse   []  Highly impulsive behaviors   []  Not attending to self-care/ADLs    []  Recent significant loss   []  Chronic pain or medical illness   []  Social isolation   []  History of violence   []  Active psychosis   []  Cognitive impairment    []  No outpatient services in place   []  Medication noncompliance   []  No collateral information to support safety   []      PROTECTIVE FACTORS:  [x] Age >25 and <55  [] Female gender   [] Denies depression  [x] Denies suicidal ideation  [x] Does not have lethal plan   [x] Does not have access to guns or weapons  [x] Patient is verbally char for safety  [x] No prior suicide attempts  [] No active substance abuse  [x] Patient has social or family support  [x] No active psychosis or cognitive dysfunction  [x] Physically healthy  [x] Has outpatient services in place  [x] Compliant with recommended medications  [] Collateral information from  supports patient safety   [] Patient is future oriented with plans to   [x] Adequate sleep and appetite    Clinical Summary:    Patient presents to ED voluntarily accompanied by mother. Patient reports he came in today because of his mood changes. Reports he is down one day then up the next. Reports he feels his medications are not right. Reports today he relapsed on Heroin and last used about 1:30 PM. Reports he currently is going to [facility] for treatment. Reports he is prescribed Suboxine however has not been getting it lately because of the high cost. Patient denies SI. Reports he did make comments to his mom however was just mad and had no intent. Reports he feels safe. Reports he goes to [facility] and today they told him to come here for evaluation to be sent to [facility]. Patient was clinically sober at the time of the evaluation. Patient was evaluated and offered supportive counseling. SW to obtain collateral information.  Once complete and medically clear

## 2020-04-29 NOTE — ED PROVIDER NOTES
Not on file    Number of children: Not on file    Years of education: Not on file    Highest education level: Not on file   Occupational History    Not on file   Social Needs    Financial resource strain: Not on file    Food insecurity     Worry: Not on file     Inability: Not on file    Transportation needs     Medical: Not on file     Non-medical: Not on file   Tobacco Use    Smoking status: Current Every Day Smoker     Packs/day: 1.00     Years: 15.00     Pack years: 15.00     Types: Cigarettes    Smokeless tobacco: Never Used   Substance and Sexual Activity    Alcohol use: No    Drug use: Yes     Types: Marijuana, Opiates      Comment: heroin     Sexual activity: Not Currently   Lifestyle    Physical activity     Days per week: Not on file     Minutes per session: Not on file    Stress: Not on file   Relationships    Social connections     Talks on phone: Not on file     Gets together: Not on file     Attends Adventism service: Not on file     Active member of club or organization: Not on file     Attends meetings of clubs or organizations: Not on file     Relationship status: Not on file    Intimate partner violence     Fear of current or ex partner: Not on file     Emotionally abused: Not on file     Physically abused: Not on file     Forced sexual activity: Not on file   Other Topics Concern    Not on file   Social History Narrative    Not on file     No current facility-administered medications for this encounter. Current Outpatient Medications   Medication Sig Dispense Refill    divalproex (DEPAKOTE) 500 MG DR tablet Take 500 mg by mouth daily HAS NOT STARTED YET      gabapentin (NEURONTIN) 400 MG capsule Take 400 mg by mouth 3 times daily.        QUEtiapine (SEROQUEL) 100 MG tablet Take 300 mg by mouth nightly       venlafaxine (EFFEXOR XR) 150 MG extended release capsule Take 150 mg by mouth daily       buprenorphine-naloxone (SUBOXONE) 8-2 MG SUBL SL tablet        Allergies Absolute 1.0 1.0 - 5.1 K/uL    Monocytes Absolute 0.5 0.0 - 1.3 K/uL    Eosinophils Absolute 0.0 0.0 - 0.6 K/uL    Basophils Absolute 0.0 0.0 - 0.2 K/uL   Comprehensive Metabolic Panel w/ Reflex to MG   Result Value Ref Range    Sodium 138 136 - 145 mmol/L    Potassium reflex Magnesium 4.3 3.5 - 5.1 mmol/L    Chloride 103 99 - 110 mmol/L    CO2 22 21 - 32 mmol/L    Anion Gap 13 3 - 16    Glucose 108 (H) 70 - 99 mg/dL    BUN 18 7 - 20 mg/dL    CREATININE 1.5 (H) 0.9 - 1.3 mg/dL    GFR Non-African American 52 (A) >60    GFR African American >60 >60    Calcium 9.2 8.3 - 10.6 mg/dL    Total Protein 7.9 6.4 - 8.2 g/dL    Alb 4.3 3.4 - 5.0 g/dL    Albumin/Globulin Ratio 1.2 1.1 - 2.2    Total Bilirubin 0.4 0.0 - 1.0 mg/dL    Alkaline Phosphatase 88 40 - 129 U/L    ALT 36 10 - 40 U/L    AST 32 15 - 37 U/L    Globulin 3.6 g/dL   Ethanol   Result Value Ref Range    Ethanol Lvl None Detected mg/dL   Drug screen multi urine   Result Value Ref Range    Amphetamine Screen, Urine Neg Negative <1000ng/mL    Barbiturate Screen, Ur Neg Negative <200 ng/mL    Benzodiazepine Screen, Urine Neg Negative <200 ng/mL    Cannabinoid Scrn, Ur Neg Negative <50 ng/mL    Cocaine Metabolite Screen, Urine Neg Negative <300 ng/mL    Opiate Scrn, Ur Neg Negative <300 ng/mL    PCP Screen, Urine Neg Negative <25 ng/mL    Methadone Screen, Urine Neg Negative <300 ng/mL    Propoxyphene Scrn, Ur Neg Negative <300 ng/mL    Oxycodone Urine Neg Negative <100 ng/ml    pH, UA 5.0     Drug Screen Comment: see below      RADIOLOGY  None     ED COURSE/MDM  Patient seen and evaluated. Old records reviewed. Labs and imaging reviewed and results discussed with patient. I have performed a medical clearance examination on this patient. It is my opinion that no medical conditions were discovered that would preclude admission to a behavioral health unit or discharge home.   I feel that the patient is medically stable for disposition by the behavioral health team at this time. He does have a small burn to the left forearm appears to be well-healing than any evidence of secondary infection or full-thickness burn. Patient advised to continue with supportive treatment, antibiotic ointment and wound care. Patient verbalized understanding and agreement the plan. Patient was evaluated by the behavioral team and at this time they recommend discharge home with close outpatient follow-up and referrals. All questions answered prior to discharge. During the patient's ED course, the patient was given:  Medications - No data to display     CLINICAL IMPRESSION  1. Mood disorder ([facility] [address]    2. Burn of left arm, first degree, initial encounter    3. Opiate abuse, episodic ([facility])        Blood pressure 109/69, pulse 92, temperature 97.5 °F (36.4 °C), temperature source Tympanic, resp. rate 18, height 5' 5\" (1.651 m), weight 180 lb (81.6 kg), SpO2 95 %. Shabbir Olsen was discharged to home in stable condition. Patient was given scripts for the following medications. I counseled patient how to take these medications. Discharge Medication List as of 4/28/2020  9:58 PM          Follow-up with:  Krysta Adler  [phone]  Schedule an appointment as soon as possible for a visit in 2 days  For recheck, To establish care with a primary care physician    Treatment center of your choice  Use [email or web address] for listing of treatment centers          DISCLAIMER: This chart was created using Dragon dictation software. Efforts were made by me to ensure accuracy, however some errors may be present due to limitations of this technology and occasionally words are not transcribed correctly.         Anibal Dakins, MD  05/02/20 0157

## 2020-05-24 ENCOUNTER — HOSPITAL ENCOUNTER (EMERGENCY)
Age: 40
Discharge: HOME | End: 2020-05-24
Attending: EMERGENCY MEDICINE
Payer: COMMERCIAL

## 2020-05-24 ENCOUNTER — APPOINTMENT (OUTPATIENT)
Dept: GENERAL RADIOLOGY | Age: 40
End: 2020-05-24
Payer: COMMERCIAL

## 2020-05-24 VITALS
DIASTOLIC BLOOD PRESSURE: 65 MMHG | TEMPERATURE: 98.8 F | HEART RATE: 82 BPM | WEIGHT: 178 LBS | BODY MASS INDEX: 27.94 KG/M2 | HEIGHT: 67 IN | SYSTOLIC BLOOD PRESSURE: 124 MMHG | RESPIRATION RATE: 18 BRPM | OXYGEN SATURATION: 97 %

## 2020-05-24 LAB
A/G RATIO: 1.8 (ref 1.1–2.2)
ALBUMIN SERPL-MCNC: 4.9 G/DL (ref 3.4–5)
ALP BLD-CCNC: 91 U/L (ref 40–129)
ALT SERPL-CCNC: 41 U/L (ref 10–40)
ANION GAP SERPL CALCULATED.3IONS-SCNC: 13 MMOL/L (ref 3–16)
AST SERPL-CCNC: 33 U/L (ref 15–37)
BASOPHILS ABSOLUTE: 0.1 K/UL (ref 0–0.2)
BASOPHILS RELATIVE PERCENT: 0.7 %
BILIRUB SERPL-MCNC: 0.7 MG/DL (ref 0–1)
BUN BLDV-MCNC: 17 MG/DL (ref 7–20)
CALCIUM SERPL-MCNC: 9.1 MG/DL (ref 8.3–10.6)
CHLORIDE BLD-SCNC: 98 MMOL/L (ref 99–110)
CO2: 24 MMOL/L (ref 21–32)
CREAT SERPL-MCNC: 1.4 MG/DL (ref 0.9–1.3)
EOSINOPHILS ABSOLUTE: 0 K/UL (ref 0–0.6)
EOSINOPHILS RELATIVE PERCENT: 0.6 %
GFR AFRICAN AMERICAN: >60
GFR NON-AFRICAN AMERICAN: 57
GLOBULIN: 2.8 G/DL
GLUCOSE BLD-MCNC: 104 MG/DL (ref 70–99)
HCT VFR BLD CALC: 40.7 % (ref 40.5–52.5)
HEMOGLOBIN: 13.7 G/DL (ref 13.5–17.5)
LYMPHOCYTES ABSOLUTE: 1.4 K/UL (ref 1–5.1)
LYMPHOCYTES RELATIVE PERCENT: 16.5 %
MCH RBC QN AUTO: 29.8 PG (ref 26–34)
MCHC RBC AUTO-ENTMCNC: 33.7 G/DL (ref 31–36)
MCV RBC AUTO: 88.4 FL (ref 80–100)
MONOCYTES ABSOLUTE: 0.6 K/UL (ref 0–1.3)
MONOCYTES RELATIVE PERCENT: 7.2 %
NEUTROPHILS ABSOLUTE: 6.2 K/UL (ref 1.7–7.7)
NEUTROPHILS RELATIVE PERCENT: 75 %
PDW BLD-RTO: 14.6 % (ref 12.4–15.4)
PLATELET # BLD: 185 K/UL (ref 135–450)
PMV BLD AUTO: 8.5 FL (ref 5–10.5)
POTASSIUM SERPL-SCNC: 4.2 MMOL/L (ref 3.5–5.1)
RBC # BLD: 4.6 M/UL (ref 4.2–5.9)
SODIUM BLD-SCNC: 135 MMOL/L (ref 136–145)
TOTAL PROTEIN: 7.7 G/DL (ref 6.4–8.2)
WBC # BLD: 8.2 K/UL (ref 4–11)

## 2020-05-24 PROCEDURE — 6370000000 HC RX 637 (ALT 250 FOR IP): Performed by: EMERGENCY MEDICINE

## 2020-05-24 PROCEDURE — 2580000003 HC RX 258: Performed by: EMERGENCY MEDICINE

## 2020-05-24 PROCEDURE — 96375 TX/PRO/DX INJ NEW DRUG ADDON: CPT

## 2020-05-24 PROCEDURE — 85025 COMPLETE CBC W/AUTO DIFF WBC: CPT

## 2020-05-24 PROCEDURE — 96374 THER/PROPH/DIAG INJ IV PUSH: CPT

## 2020-05-24 PROCEDURE — 71046 X-RAY EXAM CHEST 2 VIEWS: CPT

## 2020-05-24 PROCEDURE — 99284 EMERGENCY DEPT VISIT MOD MDM: CPT

## 2020-05-24 PROCEDURE — 80053 COMPREHEN METABOLIC PANEL: CPT

## 2020-05-24 PROCEDURE — 6360000002 HC RX W HCPCS: Performed by: EMERGENCY MEDICINE

## 2020-05-24 PROCEDURE — C9113 INJ PANTOPRAZOLE SODIUM, VIA: HCPCS | Performed by: EMERGENCY MEDICINE

## 2020-05-24 RX ORDER — PANTOPRAZOLE SODIUM 40 MG/10ML
40 INJECTION, POWDER, LYOPHILIZED, FOR SOLUTION INTRAVENOUS ONCE
Status: COMPLETED | OUTPATIENT
Start: 2020-05-24 | End: 2020-05-24

## 2020-05-24 RX ORDER — ONDANSETRON 2 MG/ML
8 INJECTION INTRAMUSCULAR; INTRAVENOUS ONCE
Status: COMPLETED | OUTPATIENT
Start: 2020-05-24 | End: 2020-05-24

## 2020-05-24 RX ORDER — 0.9 % SODIUM CHLORIDE 0.9 %
1000 INTRAVENOUS SOLUTION INTRAVENOUS ONCE
Status: COMPLETED | OUTPATIENT
Start: 2020-05-24 | End: 2020-05-24

## 2020-05-24 RX ADMIN — ONDANSETRON HYDROCHLORIDE 8 MG: 2 INJECTION, SOLUTION INTRAMUSCULAR; INTRAVENOUS at 01:17

## 2020-05-24 RX ADMIN — SODIUM CHLORIDE 1000 ML: 9 INJECTION, SOLUTION INTRAVENOUS at 01:16

## 2020-05-24 RX ADMIN — PANTOPRAZOLE SODIUM 40 MG: 40 INJECTION, POWDER, FOR SOLUTION INTRAVENOUS at 01:17

## 2020-05-24 RX ADMIN — LIDOCAINE HYDROCHLORIDE: 20 SOLUTION ORAL; TOPICAL at 01:17
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
## 2020-05-24 NOTE — ED NOTES
Pt states, \". ..dont tell nobody that I am here or what is going on. \"     Praveen Garcia RN  05/24/20 1957

## 2020-05-27 ENCOUNTER — TELEPHONE (OUTPATIENT)
Dept: GASTROENTEROLOGY | Age: 40
End: 2020-05-27

## 2021-12-08 ENCOUNTER — HOSPITAL ENCOUNTER (OUTPATIENT)
Age: 41
Discharge: HOME | End: 2021-12-08
Payer: MEDICARE

## 2021-12-08 LAB
A/G RATIO: 1.5 (ref 1.1–2.2)
ALBUMIN SERPL-MCNC: 4.2 G/DL (ref 3.4–5)
ALP BLD-CCNC: 56 U/L (ref 40–129)
ALT SERPL-CCNC: 75 U/L (ref 10–40)
AMMONIA: 41 UMOL/L (ref 16–60)
ANION GAP SERPL CALCULATED.3IONS-SCNC: 9 MMOL/L (ref 3–16)
AST SERPL-CCNC: 51 U/L (ref 15–37)
BASOPHILS ABSOLUTE: 0 K/UL (ref 0–0.2)
BASOPHILS RELATIVE PERCENT: 0.8 %
BILIRUB SERPL-MCNC: 0.6 MG/DL (ref 0–1)
BUN BLDV-MCNC: 9 MG/DL (ref 7–20)
CALCIUM SERPL-MCNC: 8.8 MG/DL (ref 8.3–10.6)
CHLORIDE BLD-SCNC: 102 MMOL/L (ref 99–110)
CHOLESTEROL, FASTING: 170 MG/DL (ref 0–199)
CO2: 29 MMOL/L (ref 21–32)
CREAT SERPL-MCNC: 1 MG/DL (ref 0.9–1.3)
EOSINOPHILS ABSOLUTE: 0.1 K/UL (ref 0–0.6)
EOSINOPHILS RELATIVE PERCENT: 2.8 %
GFR AFRICAN AMERICAN: >60
GFR NON-AFRICAN AMERICAN: >60
GLUCOSE FASTING: 89 MG/DL (ref 70–99)
HCT VFR BLD CALC: 43 % (ref 40.5–52.5)
HDLC SERPL-MCNC: 39 MG/DL (ref 40–60)
HEMOGLOBIN: 14.4 G/DL (ref 13.5–17.5)
LDL CHOLESTEROL CALCULATED: 114 MG/DL
LYMPHOCYTES ABSOLUTE: 1.3 K/UL (ref 1–5.1)
LYMPHOCYTES RELATIVE PERCENT: 37.2 %
MCH RBC QN AUTO: 31.6 PG (ref 26–34)
MCHC RBC AUTO-ENTMCNC: 33.6 G/DL (ref 31–36)
MCV RBC AUTO: 94.3 FL (ref 80–100)
MONOCYTES ABSOLUTE: 0.3 K/UL (ref 0–1.3)
MONOCYTES RELATIVE PERCENT: 7.4 %
NEUTROPHILS ABSOLUTE: 1.8 K/UL (ref 1.7–7.7)
NEUTROPHILS RELATIVE PERCENT: 51.8 %
PDW BLD-RTO: 14.5 % (ref 12.4–15.4)
PLATELET # BLD: 109 K/UL (ref 135–450)
PMV BLD AUTO: 9.6 FL (ref 5–10.5)
POTASSIUM SERPL-SCNC: 4.3 MMOL/L (ref 3.5–5.1)
RBC # BLD: 4.56 M/UL (ref 4.2–5.9)
SODIUM BLD-SCNC: 140 MMOL/L (ref 136–145)
T3 TOTAL: 1.41 NG/ML (ref 0.8–2)
T4 FREE: 1.2 NG/DL (ref 0.9–1.8)
TOTAL PROTEIN: 7 G/DL (ref 6.4–8.2)
TRIGLYCERIDE, FASTING: 85 MG/DL (ref 0–150)
TSH SERPL DL<=0.05 MIU/L-ACNC: 1.18 UIU/ML (ref 0.27–4.2)
VALPROIC ACID LEVEL: 50 UG/ML (ref 50–100)
VITAMIN D 25-HYDROXY: 28.3 NG/ML
VLDLC SERPL CALC-MCNC: 17 MG/DL
WBC # BLD: 3.5 K/UL (ref 4–11)

## 2021-12-08 PROCEDURE — 83036 HEMOGLOBIN GLYCOSYLATED A1C: CPT

## 2021-12-08 PROCEDURE — 85025 COMPLETE CBC W/AUTO DIFF WBC: CPT

## 2021-12-08 PROCEDURE — 80164 ASSAY DIPROPYLACETIC ACD TOT: CPT

## 2021-12-08 PROCEDURE — 84443 ASSAY THYROID STIM HORMONE: CPT

## 2021-12-08 PROCEDURE — 82140 ASSAY OF AMMONIA: CPT

## 2021-12-08 PROCEDURE — 84480 ASSAY TRIIODOTHYRONINE (T3): CPT

## 2021-12-08 PROCEDURE — 82306 VITAMIN D 25 HYDROXY: CPT

## 2021-12-08 PROCEDURE — 84439 ASSAY OF FREE THYROXINE: CPT

## 2021-12-08 PROCEDURE — 36415 COLL VENOUS BLD VENIPUNCTURE: CPT

## 2021-12-08 PROCEDURE — 80053 COMPREHEN METABOLIC PANEL: CPT

## 2021-12-08 PROCEDURE — 80061 LIPID PANEL: CPT

## 2021-12-09 LAB
ESTIMATED AVERAGE GLUCOSE: 111.2 MG/DL
HBA1C MFR BLD: 5.5 %

## 2021-12-30 ENCOUNTER — HOSPITAL ENCOUNTER (OUTPATIENT)
Age: 41
Discharge: HOME | End: 2021-12-30
Payer: MEDICARE

## 2021-12-30 ENCOUNTER — HOSPITAL ENCOUNTER (OUTPATIENT)
Dept: ULTRASOUND IMAGING | Age: 41
Discharge: HOME | End: 2021-12-30
Payer: MEDICARE

## 2021-12-30 DIAGNOSIS — R79.89 ELEVATED LIVER FUNCTION TESTS: ICD-10-CM

## 2021-12-30 LAB
A/G RATIO: 1.6 (ref 1.1–2.2)
ALBUMIN SERPL-MCNC: 4.6 G/DL (ref 3.4–5)
ALP BLD-CCNC: 64 U/L (ref 40–129)
ALT SERPL-CCNC: 70 U/L (ref 10–40)
ANION GAP SERPL CALCULATED.3IONS-SCNC: 13 MMOL/L (ref 3–16)
AST SERPL-CCNC: 50 U/L (ref 15–37)
BASOPHILS ABSOLUTE: 0 K/UL (ref 0–0.2)
BASOPHILS RELATIVE PERCENT: 0.7 %
BILIRUB SERPL-MCNC: 0.4 MG/DL (ref 0–1)
BLOOD SMEAR REVIEW: NORMAL
BUN BLDV-MCNC: 5 MG/DL (ref 7–20)
CALCIUM SERPL-MCNC: 9.3 MG/DL (ref 8.3–10.6)
CHLORIDE BLD-SCNC: 99 MMOL/L (ref 99–110)
CO2: 27 MMOL/L (ref 21–32)
CREAT SERPL-MCNC: 1 MG/DL (ref 0.9–1.3)
EOSINOPHILS ABSOLUTE: 0 K/UL (ref 0–0.6)
EOSINOPHILS RELATIVE PERCENT: 1.4 %
GAMMA GLUTAMYL TRANSFERASE: 79 U/L (ref 8–61)
GFR AFRICAN AMERICAN: >60
GFR NON-AFRICAN AMERICAN: >60
GLUCOSE BLD-MCNC: 84 MG/DL (ref 70–99)
HCT VFR BLD CALC: 46 % (ref 40.5–52.5)
HEMOGLOBIN: 15.1 G/DL (ref 13.5–17.5)
LYMPHOCYTES ABSOLUTE: 1.3 K/UL (ref 1–5.1)
LYMPHOCYTES RELATIVE PERCENT: 39.4 %
MCH RBC QN AUTO: 30.3 PG (ref 26–34)
MCHC RBC AUTO-ENTMCNC: 32.8 G/DL (ref 31–36)
MCV RBC AUTO: 92.5 FL (ref 80–100)
MONOCYTES ABSOLUTE: 0.2 K/UL (ref 0–1.3)
MONOCYTES RELATIVE PERCENT: 6.8 %
NEUTROPHILS ABSOLUTE: 1.8 K/UL (ref 1.7–7.7)
NEUTROPHILS RELATIVE PERCENT: 51.7 %
PDW BLD-RTO: 14.4 % (ref 12.4–15.4)
PLATELET # BLD: 113 K/UL (ref 135–450)
PMV BLD AUTO: 10.9 FL (ref 5–10.5)
POTASSIUM SERPL-SCNC: 4.3 MMOL/L (ref 3.5–5.1)
RBC # BLD: 4.98 M/UL (ref 4.2–5.9)
SODIUM BLD-SCNC: 139 MMOL/L (ref 136–145)
TOTAL PROTEIN: 7.5 G/DL (ref 6.4–8.2)
WBC # BLD: 3.4 K/UL (ref 4–11)

## 2021-12-30 PROCEDURE — 80074 ACUTE HEPATITIS PANEL: CPT

## 2021-12-30 PROCEDURE — 83915 ASSAY OF NUCLEOTIDASE: CPT

## 2021-12-30 PROCEDURE — 87390 HIV-1 AG IA: CPT

## 2021-12-30 PROCEDURE — 86702 HIV-2 ANTIBODY: CPT

## 2021-12-30 PROCEDURE — 87522 HEPATITIS C REVRS TRNSCRPJ: CPT

## 2021-12-30 PROCEDURE — 82977 ASSAY OF GGT: CPT

## 2021-12-30 PROCEDURE — 76705 ECHO EXAM OF ABDOMEN: CPT

## 2021-12-30 PROCEDURE — 85025 COMPLETE CBC W/AUTO DIFF WBC: CPT

## 2021-12-30 PROCEDURE — 36415 COLL VENOUS BLD VENIPUNCTURE: CPT

## 2021-12-30 PROCEDURE — 80053 COMPREHEN METABOLIC PANEL: CPT

## 2021-12-30 PROCEDURE — 87902 NFCT AGT GNTYP ALYS HEP C: CPT

## 2021-12-30 PROCEDURE — 86701 HIV-1ANTIBODY: CPT

## 2021-12-31 LAB
HAV IGM SER IA-ACNC: ABNORMAL
HEPATITIS B CORE IGM ANTIBODY: ABNORMAL
HEPATITIS B SURFACE ANTIGEN INTERPRETATION: ABNORMAL
HEPATITIS C ANTIBODY INTERPRETATION: REACTIVE
HIV AG/AB: NORMAL
HIV ANTIGEN: NORMAL
HIV-1 ANTIBODY: NORMAL
HIV-2 AB: NORMAL

## 2022-01-02 LAB
HCV QNT BY NAAT IU/ML: ABNORMAL IU/ML
HCV QNT BY NAAT LOG IU/ML: 7.07 LOG IU/ML
INTERPRETATION: DETECTED

## 2022-01-03 LAB — HEMATOLOGY PATH CONSULT: NORMAL

## 2022-01-05 LAB — 5-NUCLEOTIDASE: 13 U/L (ref 0–15)

## 2022-01-07 LAB — HEPATITIS C GENOTYPE: NORMAL

## 2022-06-27 ENCOUNTER — HOSPITAL ENCOUNTER (OUTPATIENT)
Age: 42
Discharge: HOME | End: 2022-06-27
Payer: MEDICARE

## 2022-06-27 LAB
A/G RATIO: 1.6 (ref 1.1–2.2)
ALBUMIN SERPL-MCNC: 4.4 G/DL (ref 3.4–5)
ALP BLD-CCNC: 54 U/L (ref 40–129)
ALT SERPL-CCNC: 29 U/L (ref 10–40)
ANION GAP SERPL CALCULATED.3IONS-SCNC: 9 MMOL/L (ref 3–16)
AST SERPL-CCNC: 29 U/L (ref 15–37)
BASOPHILS ABSOLUTE: 0 K/UL (ref 0–0.2)
BASOPHILS RELATIVE PERCENT: 1 %
BILIRUB SERPL-MCNC: 0.4 MG/DL (ref 0–1)
BUN BLDV-MCNC: 7 MG/DL (ref 7–20)
CALCIUM SERPL-MCNC: 9.2 MG/DL (ref 8.3–10.6)
CHLORIDE BLD-SCNC: 102 MMOL/L (ref 99–110)
CO2: 29 MMOL/L (ref 21–32)
CREAT SERPL-MCNC: 1.1 MG/DL (ref 0.9–1.3)
EOSINOPHILS ABSOLUTE: 0.1 K/UL (ref 0–0.6)
EOSINOPHILS RELATIVE PERCENT: 1.7 %
GFR AFRICAN AMERICAN: >60
GFR NON-AFRICAN AMERICAN: >60
GLUCOSE BLD-MCNC: 75 MG/DL (ref 70–99)
HCT VFR BLD CALC: 42.7 % (ref 40.5–52.5)
HEMOGLOBIN: 14.9 G/DL (ref 13.5–17.5)
INR BLD: 1.01 (ref 0.87–1.14)
LIPASE: 31 U/L (ref 13–60)
LYMPHOCYTES ABSOLUTE: 1.3 K/UL (ref 1–5.1)
LYMPHOCYTES RELATIVE PERCENT: 37.5 %
MCH RBC QN AUTO: 32.2 PG (ref 26–34)
MCHC RBC AUTO-ENTMCNC: 34.9 G/DL (ref 31–36)
MCV RBC AUTO: 92.2 FL (ref 80–100)
MONOCYTES ABSOLUTE: 0.2 K/UL (ref 0–1.3)
MONOCYTES RELATIVE PERCENT: 6.8 %
NEUTROPHILS ABSOLUTE: 1.9 K/UL (ref 1.7–7.7)
NEUTROPHILS RELATIVE PERCENT: 53 %
PDW BLD-RTO: 14 % (ref 12.4–15.4)
PLATELET # BLD: 102 K/UL (ref 135–450)
PMV BLD AUTO: 10 FL (ref 5–10.5)
POTASSIUM SERPL-SCNC: 4 MMOL/L (ref 3.5–5.1)
PROTHROMBIN TIME: 13.2 SEC (ref 11.7–14.5)
RBC # BLD: 4.64 M/UL (ref 4.2–5.9)
SODIUM BLD-SCNC: 140 MMOL/L (ref 136–145)
TOTAL PROTEIN: 7.1 G/DL (ref 6.4–8.2)
WBC # BLD: 3.6 K/UL (ref 4–11)

## 2022-06-27 PROCEDURE — 80053 COMPREHEN METABOLIC PANEL: CPT

## 2022-06-27 PROCEDURE — 85610 PROTHROMBIN TIME: CPT

## 2022-06-27 PROCEDURE — 83690 ASSAY OF LIPASE: CPT

## 2022-06-27 PROCEDURE — 36415 COLL VENOUS BLD VENIPUNCTURE: CPT

## 2022-06-27 PROCEDURE — 85025 COMPLETE CBC W/AUTO DIFF WBC: CPT

## 2022-06-27 PROCEDURE — 87522 HEPATITIS C REVRS TRNSCRPJ: CPT

## 2022-06-27 PROCEDURE — 82105 ALPHA-FETOPROTEIN SERUM: CPT

## 2022-06-29 LAB
AFP: 1.6 UG/L
HCV QNT BY NAAT IU/ML: ABNORMAL IU/ML
HCV QNT BY NAAT LOG IU/ML: 7.52 LOG IU/ML
INTERPRETATION: DETECTED

## 2022-08-08 ENCOUNTER — HOSPITAL ENCOUNTER (OUTPATIENT)
Age: 42
Discharge: HOME | End: 2022-08-08
Payer: MEDICARE

## 2022-08-08 LAB
A/G RATIO: 1.4 (ref 1.1–2.2)
ALBUMIN SERPL-MCNC: 4.2 G/DL (ref 3.4–5)
ALP BLD-CCNC: 51 U/L (ref 40–129)
ALT SERPL-CCNC: 7 U/L (ref 10–40)
ANION GAP SERPL CALCULATED.3IONS-SCNC: 21 MMOL/L (ref 3–16)
AST SERPL-CCNC: 15 U/L (ref 15–37)
BASOPHILS ABSOLUTE: 0 K/UL (ref 0–0.2)
BASOPHILS RELATIVE PERCENT: 0.4 %
BILIRUB SERPL-MCNC: 0.4 MG/DL (ref 0–1)
BUN BLDV-MCNC: 13 MG/DL (ref 7–20)
CALCIUM SERPL-MCNC: 9.4 MG/DL (ref 8.3–10.6)
CHLORIDE BLD-SCNC: 103 MMOL/L (ref 99–110)
CO2: 18 MMOL/L (ref 21–32)
CREAT SERPL-MCNC: 1.1 MG/DL (ref 0.9–1.3)
EOSINOPHILS ABSOLUTE: 0 K/UL (ref 0–0.6)
EOSINOPHILS RELATIVE PERCENT: 0.7 %
GFR AFRICAN AMERICAN: >60
GFR NON-AFRICAN AMERICAN: >60
GLUCOSE BLD-MCNC: 148 MG/DL (ref 70–99)
HCT VFR BLD CALC: 38 % (ref 40.5–52.5)
HEMOGLOBIN: 13.4 G/DL (ref 13.5–17.5)
LYMPHOCYTES ABSOLUTE: 1.1 K/UL (ref 1–5.1)
LYMPHOCYTES RELATIVE PERCENT: 26.9 %
MCH RBC QN AUTO: 32 PG (ref 26–34)
MCHC RBC AUTO-ENTMCNC: 35.1 G/DL (ref 31–36)
MCV RBC AUTO: 91.2 FL (ref 80–100)
MONOCYTES ABSOLUTE: 0.2 K/UL (ref 0–1.3)
MONOCYTES RELATIVE PERCENT: 3.9 %
NEUTROPHILS ABSOLUTE: 2.9 K/UL (ref 1.7–7.7)
NEUTROPHILS RELATIVE PERCENT: 68.1 %
PDW BLD-RTO: 13.9 % (ref 12.4–15.4)
PLATELET # BLD: 120 K/UL (ref 135–450)
PMV BLD AUTO: 11 FL (ref 5–10.5)
POTASSIUM SERPL-SCNC: 4.2 MMOL/L (ref 3.5–5.1)
RBC # BLD: 4.17 M/UL (ref 4.2–5.9)
SODIUM BLD-SCNC: 142 MMOL/L (ref 136–145)
TOTAL PROTEIN: 7.1 G/DL (ref 6.4–8.2)
WBC # BLD: 4.3 K/UL (ref 4–11)

## 2022-08-08 PROCEDURE — 80061 LIPID PANEL: CPT

## 2022-08-08 PROCEDURE — 84443 ASSAY THYROID STIM HORMONE: CPT

## 2022-08-08 PROCEDURE — 87522 HEPATITIS C REVRS TRNSCRPJ: CPT

## 2022-08-08 PROCEDURE — 85025 COMPLETE CBC W/AUTO DIFF WBC: CPT

## 2022-08-08 PROCEDURE — 80053 COMPREHEN METABOLIC PANEL: CPT

## 2022-08-08 PROCEDURE — 80164 ASSAY DIPROPYLACETIC ACD TOT: CPT

## 2022-08-09 LAB
CHOLESTEROL, TOTAL: 195 MG/DL (ref 0–199)
HDLC SERPL-MCNC: 38 MG/DL (ref 40–60)
LDL CHOLESTEROL CALCULATED: 137 MG/DL
TRIGL SERPL-MCNC: 98 MG/DL (ref 0–150)
TSH SERPL DL<=0.05 MIU/L-ACNC: 0.92 UIU/ML (ref 0.27–4.2)
VALPROIC ACID LEVEL: 61.8 UG/ML (ref 50–100)
VLDLC SERPL CALC-MCNC: 20 MG/DL

## 2022-08-10 LAB
HCV QNT BY NAAT IU/ML: NOT DETECTED IU/ML
HCV QNT BY NAAT LOG IU/ML: NOT DETECTED LOG IU/ML
INTERPRETATION: NOT DETECTED

## 2022-10-18 ENCOUNTER — HOSPITAL ENCOUNTER (OUTPATIENT)
Age: 42
Discharge: HOME | End: 2022-10-18
Payer: MEDICARE

## 2022-10-18 LAB
A/G RATIO: 2 (ref 1.1–2.2)
ALBUMIN SERPL-MCNC: 4.9 G/DL (ref 3.4–5)
ALP BLD-CCNC: 54 U/L (ref 40–129)
ALT SERPL-CCNC: 14 U/L (ref 10–40)
AMMONIA: 21 UMOL/L (ref 16–60)
ANION GAP SERPL CALCULATED.3IONS-SCNC: 15 MMOL/L (ref 3–16)
AST SERPL-CCNC: 21 U/L (ref 15–37)
BASOPHILS ABSOLUTE: 0 K/UL (ref 0–0.2)
BASOPHILS RELATIVE PERCENT: 0.6 %
BILIRUB SERPL-MCNC: 0.5 MG/DL (ref 0–1)
BUN BLDV-MCNC: 13 MG/DL (ref 7–20)
CALCIUM SERPL-MCNC: 9.7 MG/DL (ref 8.3–10.6)
CHLORIDE BLD-SCNC: 102 MMOL/L (ref 99–110)
CHOLESTEROL, TOTAL: 187 MG/DL (ref 0–199)
CO2: 24 MMOL/L (ref 21–32)
CREAT SERPL-MCNC: 1.1 MG/DL (ref 0.9–1.3)
EOSINOPHILS ABSOLUTE: 0 K/UL (ref 0–0.6)
EOSINOPHILS RELATIVE PERCENT: 0.7 %
GFR SERPL CREATININE-BSD FRML MDRD: >60 ML/MIN/{1.73_M2}
GLUCOSE BLD-MCNC: 143 MG/DL (ref 70–99)
HCT VFR BLD CALC: 41.7 % (ref 40.5–52.5)
HDLC SERPL-MCNC: 45 MG/DL (ref 40–60)
HEMOGLOBIN: 14.5 G/DL (ref 13.5–17.5)
LDL CHOLESTEROL CALCULATED: 127 MG/DL
LYMPHOCYTES ABSOLUTE: 1.9 K/UL (ref 1–5.1)
LYMPHOCYTES RELATIVE PERCENT: 33.7 %
MCH RBC QN AUTO: 32.2 PG (ref 26–34)
MCHC RBC AUTO-ENTMCNC: 34.8 G/DL (ref 31–36)
MCV RBC AUTO: 92.6 FL (ref 80–100)
MONOCYTES ABSOLUTE: 0.4 K/UL (ref 0–1.3)
MONOCYTES RELATIVE PERCENT: 7 %
NEUTROPHILS ABSOLUTE: 3.3 K/UL (ref 1.7–7.7)
NEUTROPHILS RELATIVE PERCENT: 58 %
PDW BLD-RTO: 14.3 % (ref 12.4–15.4)
PLATELET # BLD: 171 K/UL (ref 135–450)
PMV BLD AUTO: 9.4 FL (ref 5–10.5)
POTASSIUM SERPL-SCNC: 4.6 MMOL/L (ref 3.5–5.1)
RBC # BLD: 4.5 M/UL (ref 4.2–5.9)
SODIUM BLD-SCNC: 141 MMOL/L (ref 136–145)
TOTAL PROTEIN: 7.4 G/DL (ref 6.4–8.2)
TRIGL SERPL-MCNC: 73 MG/DL (ref 0–150)
TSH SERPL DL<=0.05 MIU/L-ACNC: 1.88 UIU/ML (ref 0.27–4.2)
VALPROIC ACID LEVEL: 58.9 UG/ML (ref 50–100)
VLDLC SERPL CALC-MCNC: 15 MG/DL
WBC # BLD: 5.7 K/UL (ref 4–11)

## 2022-10-18 PROCEDURE — 80164 ASSAY DIPROPYLACETIC ACD TOT: CPT

## 2022-10-18 PROCEDURE — 85025 COMPLETE CBC W/AUTO DIFF WBC: CPT

## 2022-10-18 PROCEDURE — 82140 ASSAY OF AMMONIA: CPT

## 2022-10-18 PROCEDURE — 87522 HEPATITIS C REVRS TRNSCRPJ: CPT

## 2022-10-18 PROCEDURE — 84443 ASSAY THYROID STIM HORMONE: CPT

## 2022-10-18 PROCEDURE — 80061 LIPID PANEL: CPT

## 2022-10-18 PROCEDURE — 80053 COMPREHEN METABOLIC PANEL: CPT

## 2023-03-20 ENCOUNTER — HOSPITAL ENCOUNTER (OUTPATIENT)
Age: 43
Discharge: HOME | End: 2023-03-20
Payer: MEDICAID

## 2023-03-20 LAB
ALBUMIN SERPL-MCNC: 4.4 G/DL (ref 3.4–5)
ALBUMIN/GLOB SERPL: 1.4 {RATIO} (ref 1.1–2.2)
ALP SERPL-CCNC: 53 U/L (ref 40–129)
ALT SERPL-CCNC: 15 U/L (ref 10–40)
AMMONIA PLAS-SCNC: 79 UMOL/L (ref 16–60)
ANION GAP SERPL CALCULATED.3IONS-SCNC: 10 MMOL/L (ref 3–16)
AST SERPL-CCNC: 20 U/L (ref 15–37)
BASOPHILS # BLD: 0 K/UL (ref 0–0.2)
BASOPHILS NFR BLD: 0.4 %
BILIRUB SERPL-MCNC: 0.3 MG/DL (ref 0–1)
BUN SERPL-MCNC: 10 MG/DL (ref 7–20)
CALCIUM SERPL-MCNC: 9.5 MG/DL (ref 8.3–10.6)
CHLORIDE SERPL-SCNC: 104 MMOL/L (ref 99–110)
CHOLEST SERPL-MCNC: 193 MG/DL (ref 0–199)
CO2 SERPL-SCNC: 28 MMOL/L (ref 21–32)
CREAT SERPL-MCNC: 1.1 MG/DL (ref 0.9–1.3)
DEPRECATED RDW RBC AUTO: 14.3 % (ref 12.4–15.4)
EOSINOPHIL # BLD: 0.1 K/UL (ref 0–0.6)
EOSINOPHIL NFR BLD: 1.6 %
GFR SERPLBLD CREATININE-BSD FMLA CKD-EPI: >60 ML/MIN/{1.73_M2}
GLUCOSE SERPL-MCNC: 95 MG/DL (ref 70–99)
HCT VFR BLD AUTO: 42.5 % (ref 40.5–52.5)
HDLC SERPL-MCNC: 39 MG/DL (ref 40–60)
HGB BLD-MCNC: 14.5 G/DL (ref 13.5–17.5)
LDLC SERPL CALC-MCNC: 131 MG/DL
LYMPHOCYTES # BLD: 2.2 K/UL (ref 1–5.1)
LYMPHOCYTES NFR BLD: 49 %
MCH RBC QN AUTO: 31.4 PG (ref 26–34)
MCHC RBC AUTO-ENTMCNC: 34.1 G/DL (ref 31–36)
MCV RBC AUTO: 92.2 FL (ref 80–100)
MONOCYTES # BLD: 0.2 K/UL (ref 0–1.3)
MONOCYTES NFR BLD: 5.4 %
NEUTROPHILS # BLD: 1.9 K/UL (ref 1.7–7.7)
NEUTROPHILS NFR BLD: 43.6 %
PLATELET # BLD AUTO: 140 K/UL (ref 135–450)
PMV BLD AUTO: 10.2 FL (ref 5–10.5)
POTASSIUM SERPL-SCNC: 4.1 MMOL/L (ref 3.5–5.1)
PROT SERPL-MCNC: 7.5 G/DL (ref 6.4–8.2)
RBC # BLD AUTO: 4.61 M/UL (ref 4.2–5.9)
SODIUM SERPL-SCNC: 142 MMOL/L (ref 136–145)
T3 SERPL-MCNC: 1.24 NG/ML (ref 0.8–2)
T4 FREE SERPL-MCNC: 1.3 NG/DL (ref 0.9–1.8)
TRIGL SERPL-MCNC: 113 MG/DL (ref 0–150)
TSH SERPL DL<=0.005 MIU/L-ACNC: 0.94 UIU/ML (ref 0.27–4.2)
VALPROATE SERPL-MCNC: 90.7 UG/ML (ref 50–100)
VLDLC SERPL CALC-MCNC: 23 MG/DL
WBC # BLD AUTO: 4.4 K/UL (ref 4–11)

## 2023-03-20 PROCEDURE — 84443 ASSAY THYROID STIM HORMONE: CPT

## 2023-03-20 PROCEDURE — 85025 COMPLETE CBC W/AUTO DIFF WBC: CPT

## 2023-03-20 PROCEDURE — 82140 ASSAY OF AMMONIA: CPT

## 2023-03-20 PROCEDURE — 84439 ASSAY OF FREE THYROXINE: CPT

## 2023-03-20 PROCEDURE — 80053 COMPREHEN METABOLIC PANEL: CPT

## 2023-03-20 PROCEDURE — 84480 ASSAY TRIIODOTHYRONINE (T3): CPT

## 2023-03-20 PROCEDURE — 87522 HEPATITIS C REVRS TRNSCRPJ: CPT

## 2023-03-20 PROCEDURE — 80061 LIPID PANEL: CPT

## 2023-03-20 PROCEDURE — 80164 ASSAY DIPROPYLACETIC ACD TOT: CPT

## 2023-03-20 PROCEDURE — 36415 COLL VENOUS BLD VENIPUNCTURE: CPT

## 2023-03-22 LAB
HCV RNA SERPL NAA+PROBE-ACNC: NOT DETECTED IU/ML
HCV RNA SERPL NAA+PROBE-LOG IU: NOT DETECTED LOG IU/ML
HCV RNA SERPL QL NAA+PROBE: NOT DETECTED